# Patient Record
Sex: MALE | Race: BLACK OR AFRICAN AMERICAN
[De-identification: names, ages, dates, MRNs, and addresses within clinical notes are randomized per-mention and may not be internally consistent; named-entity substitution may affect disease eponyms.]

---

## 2020-09-17 ENCOUNTER — HOSPITAL ENCOUNTER (INPATIENT)
Dept: HOSPITAL 74 - YASAS | Age: 70
LOS: 15 days | Discharge: HOME | DRG: 895 | End: 2020-10-02
Attending: ALLERGY & IMMUNOLOGY | Admitting: ALLERGY & IMMUNOLOGY
Payer: COMMERCIAL

## 2020-09-17 VITALS — BODY MASS INDEX: 23.8 KG/M2

## 2020-09-17 DIAGNOSIS — F17.210: ICD-10-CM

## 2020-09-17 DIAGNOSIS — R32: ICD-10-CM

## 2020-09-17 DIAGNOSIS — H92.02: ICD-10-CM

## 2020-09-17 DIAGNOSIS — F10.96: ICD-10-CM

## 2020-09-17 DIAGNOSIS — Z90.79: ICD-10-CM

## 2020-09-17 DIAGNOSIS — I10: ICD-10-CM

## 2020-09-17 DIAGNOSIS — J45.909: ICD-10-CM

## 2020-09-17 DIAGNOSIS — F10.282: Primary | ICD-10-CM

## 2020-09-17 DIAGNOSIS — H61.22: ICD-10-CM

## 2020-09-17 DIAGNOSIS — K74.60: ICD-10-CM

## 2020-09-17 DIAGNOSIS — Z86.19: ICD-10-CM

## 2020-09-17 PROCEDURE — HZ42ZZZ GROUP COUNSELING FOR SUBSTANCE ABUSE TREATMENT, COGNITIVE-BEHAVIORAL: ICD-10-PCS | Performed by: ALLERGY & IMMUNOLOGY

## 2020-09-17 RX ADMIN — HYDROXYZINE PAMOATE SCH MG: 25 CAPSULE ORAL at 21:47

## 2020-09-17 RX ADMIN — Medication SCH MG: at 21:47

## 2020-09-17 NOTE — XMS
Summarization Of Episode

                          Created on:2020



Patient:IRAIS RAMIREZ

Sex:Male

:1950

External Reference #:[id number]





Demographics







                          Address                   [address] [place] [address]

 

                          Home Phone                [phone]

 

                          Preferred Language        Unknown

 

                          Marital Status            Unknown

 

                          [place] Affiliation     [place]

 

                          Race                      BL

 

                          Ethnic Group              Unknown









Author







                          Organization              [facility]









Support







                Name            Relationship    Address         Phone

 

                UE              Unavailable     Unavailable     Unavailable

 

                BERGMAN, SKY () OTHER RELATIONSHIP [address] 

[phone]



                                                         



                                                [place] [address] 









Re-disclosure Warning

The records that you are about to access may contain information from federally-
assisted alcohol or drug abuse programs. If such information is present, then 
the following federally mandated warning applies: This information has been 
disclosed to you from records protected by federal confidentiality rules (42 CFR
part 2). The federal rules prohibit you from making any further disclosure of 
this information unless further disclosure is expressly permitted by the written
consent of the person to whom it pertains or as otherwise permitted by 42 CFR 
part 2. A general authorization for the release of medical or other information 
is NOT sufficient for this purpose. The Federal rules restrict any use of the 
information to criminally investigate or prosecute any alcohol or drug abuse 
patient.The records that you are about to access may contain highly sensitive 
health information, the redisclosure of which is protected by Article 27-F of 
the [facility] Public Health law. If you continue you may haveaccess to 
information: Regarding HIV / AIDS; Provided by facilities licensed or operated 
by the [facility] Office of Mental Health; or Provided by the [facility]
Office for People With Developmental Disabilities. If such information is 
present, then the following [place] mandated warning applies: This 
information has been disclosed to you from confidential records which are 
protected by state law. State law prohibits you from making any further 
disclosure of this information without the specific written consent of the 
person to whom it pertains, or as otherwise permitted by law. Any unauthorized 
further disclosure in violation of state law may result in a fine or retirement 
sentence or both. A general authorization for the release of medical or other 
information is NOT sufficient authorization for further disclosure.



Insurance Providers







          Payer name Policy type Policy ID Covered   Covered party's Policy    P

carlton



                    / Coverage           party ID  relationship to Martin    Inf

ormation



                    type                          martin              

 

          MEDICAID            [id number]            SP                  [id number]

 

          MEDICARE            [id number]           SP                  [id number]

31



                              1                                       







Results







                    ID                  Date                Data Source

 

                    [id number]         2020 01:15:00 PM EDT [facility]











          Name      Value     Range     Interpretation Description Data      Sup

porting



                                        Code                Source(s) Document(s

)

 

          SARS                                              [facility]   



          coronavirus 2                                                   



          RNA                                                         









                                        This lab was ordered by [facility]

ct Bill [facility] and reported by [facility].











                    ID                  Date                Data Source

 

                    [id number]  2020 07:38:00 PM EDT [facility]











          Name      Value     Range     Interpretation Code Description Data Radha

rce(s) Supporting



                                                                      Document(s

)

 

          Overall                                           [facility]    



          Result:                                                     









                                        This lab was ordered by [facility] and reported by [facility].









                                        Procedure

## 2020-09-17 NOTE — PN
[facility] Progress Note


Note: 





EKG  notes NSR , septal  infarct,  age  undetermined,  ST & Marked  T-wave  

abnormality  , consider anterolateral  ischemia  .


Pt  is asymptomatic  . 


                               Vital Signs - 24 hr











  09/17/20 09/17/20 09/17/20





  19:32 19:44 20:03


 


Temperature 97.3 F L 97.3 F L 97.3 F L


 


Pulse Rate 56 L 56 L 56 L


 


Respiratory 17 17 17





Rate   


 


Blood Pressure 141/86 141/86 141/86











Per MR  , pt had cardiac evaluation @ [facility]  telemetry  





ECHO 9/10/2020  : LVH  with  normal systolic  function  , EF  65-70 %  grade  2 

diastolic  dysfunction  , mild MR  CXR  9/9/2020  Normal 


Myocardial  perfusion scan  9/11/2020 :  normal  





EKG  on admission 9/09/2020  : Sinus  rhythm , T- inversion leads  II, III, aVF 

, V4-6  , troponin 0.01,  Serial EKGs  no new changes  , Troponin trended  

0.01-0.01-0.06-0.05   . 


Nuclear stress  test  normal.  





Advised to  f/up w/ PMD and cardiology  upon d/c .

## 2020-09-17 NOTE — XMS
Summarization Of Episode

                          Created on:2020



Patient:IRAIS RAMIREZ

Sex:Male

:1950

External Reference #:[id number]





Demographics







                          Address                   [address]



                                                    [address] [place] [address]

 

                          Home Phone                [phone]

 

                          Preferred Language        Unknown

 

                          Marital Status            Unknown

 

                          Faith Affiliation     [place]

 

                          Race                      BL

 

                          Ethnic Group              Unknown









Author







                          Organization              [facility]









Support







                Name            Relationship    Address         Phone

 

                UE              Unavailable     Unavailable     Unavailable

 

                SKY BERGMAN () OTHER RELATIONSHIP [address] 

[phone]



                                                         



                                                [place] [address] 









Re-disclosure Warning

The records that you are about to access may contain information from federally-
assisted alcohol or drug abuse programs. If such information is present, then 
the following federally mandated warning applies: This information has been 
disclosed to you from records protected by federal confidentiality rules (42 CFR
part 2). The federal rules prohibit you from making any further disclosure of 
this information unless further disclosure is expressly permitted by the written
consent of the person to whom it pertains or as otherwise permitted by 42 CFR 
part 2. A general authorization for the release of medical or other information 
is NOT sufficient for this purpose. The Federal rules restrict any use of the 
information to criminally investigate or prosecute any alcohol or drug abuse 
patient.The records that you are about to access may contain highly sensitive 
health information, the redisclosure of which is protected by Article 27-F of 
the [facility] Public Health law. If you continue you may haveaccess to 
information: Regarding HIV / AIDS; Provided by facilities licensed or operated 
by the [facility] Office of Mental Health; or Provided by the [facility]
Office for People With Developmental Disabilities. If such information is 
present, then the following [place] mandated warning applies: This 
information has been disclosed to you from confidential records which are 
protected by state law. State law prohibits you from making any further 
disclosure of this information without the specific written consent of the 
person to whom it pertains, or as otherwise permitted by law. Any unauthorized 
further disclosure in violation of state law may result in a fine or long-term 
sentence or both. A general authorization for the release of medical or other 
information is NOT sufficient authorization for further disclosure.



Insurance Providers







          Payer name Policy type Policy ID Covered   Covered party's Policy    P

carlton



                    / Coverage           party ID  relationship to Martin    Inf

ormation



                    type                          martin              

 

          MEDICAID            [id number]            SP                  [id number]

 

          MEDICARE            [id number]           SP                  [id number]

31



                              1                                       







Results







                    ID                  Date                Data Source

 

                    [id number]         2020 01:15:00 PM EDT [facility]











          Name      Value     Range     Interpretation Description Data      Sup

porting



                                        Code                Source(s) Document(s

)

 

          SARS                                              [facility]   



          coronavirus 2                                                   



          RNA                                                         









                                        This lab was ordered by [facility]

ct Bill [facility] and reported by [facility].











                    ID                  Date                Data Source

 

                    [id number]  2020 07:38:00 PM EDT [facility]











          Name      Value     Range     Interpretation Code Description Data Radha

rce(s) Supporting



                                                                      Document(s

)

 

          Overall                                           [facility]    



          Result:                                                     









                                        This lab was ordered by [facility] and reported by [facility].









                                        Procedure

## 2020-09-17 NOTE — HP
CIWA Score


Nausea/Vomitin-No Nausea/No Vomiting


Muscle Tremors: None


Anxiety: 0-No Anxiety, at Ease


Agitation: 0-Normal Activity


Paroxysmal Sweats: No Perspiration


Orientation: 0-Oriented


Tacttile Disturbances: 0-None


Auditory Disturbances: 0-None


Visual Disturbances: 0-None





- Admission Criteria


[facility] Guidelines: Admission for Medically Managed Detox: 


Requires at least one of the followin. CIWA greater than 12


2. Seizures within the past 24 hours


3. Delirium tremens within the past 24 hours


4. Hallucinations within the past 24 hours


5. Acute intervention needed for co  occurring medical disorder


6. Acute intervention needed for co  occurring psychiatric disorder


7. Severe withdrawal that cannot be handled at a lower level of care (continued


    vomiting, continued diarrhea, abnormal vital signs) requiring intravenous


    medication and/or fluids


8. Pregnancy








Admitting History and Physical





- Admission


History of Present Illness: 





Patient is a 69 y.o. M PMHx HTN, liver cirrhosis, partial prostatectomy, treated

Hep C presenting to [facility] for rehab. Patient was examined in the room and is

in no acute distress. Patients most recent substance use consists of alcohol 2 

beers last week but had not drank for years prior. 


History Source: Patient


Limitations to Obtaining History: No Limitations





- Past Medical History


CNS: No: Seizure


Cardiovascular: Yes: HTN.  No: Hyperlipdemia


Pulmonary: No: Asthma


Gastrointestinal: No: GERD, GI Bleed


Hepatobiliary: Yes: Cirrhosis, Hepatitis C (treated)


Infectious Disease: No: HIV, STD's, Tuberculosis


Psych: No: Anxiety, Bipolar





- Past Surgical History


Past Surgical History: Yes: Prostatectomy





- Smoking History


Have you smoked in the past 12 months: No





- Alcohol/Substance Use


Hx Alcohol Use: No


History of Substance Use: reports: None





- Social History


Usual Living Arrangement: Yes: Alone


ADL: Independent


History of Recent Travel: No





Admission ROS [facility]


Exam Limitations: No Limitations





- Ebola screening


Have you traveled outside of the country in the last 21 days: No


Have you had contact with anyone from an Ebola affected area: No


Have you been sick,other than usual withdrawal symptoms: No


Do you have a fever: No





- Review of Systems


Constitutional: No Symptoms Reported


EENT: denies: Blurred Vision, Double Vision


Respiratory: denies: Cough, Shortness of Breath


Cardiac: denies: Chest Pain, Lightheadedness


GI: denies: Constipated, Diarrhea, Nausea, Vomiting


: reports: Incontinence.  denies: Burning, Dysuria


Musculoskeletal: denies: Muscle Pain, Muscle Weakness


Neuro: denies: Headache, Dizziness


Hematology: denies: Easy Bleeding


Psychiatric: reports: No Sypmtoms Reported, Judgement Intact, Mood/Affect 

Appropiate, Orientated x3





Patient History





- Smoking Cessation


Smoking history: Former smoker


Have you smoked in the past 12 months: No


Initiated information on smoking cessation: Yes


'Breaking Loose' booklet given: 20





Admission Physical Exam [facility]





- Vital Signs


Vital Signs: 


                               Vital Signs - 24 hr











  20





  19:32


 


Temperature 97.3 F L


 


Pulse Rate 56 L


 


Respiratory 17





Rate 


 


Blood Pressure 141/86














- Physical


General Appearance: Yes: Within Normal Limits, No Apparent Distress, Nourished, 

Appropriately Dressed


Respiratory: Yes: Within Normal Limits, Lungs Clear, Normal Breath Sounds, No 

Respiratory Distress, No Accessory Muscle Use


Cardiology: Yes: Within Normal Limits, Regular Rhythm, Regular Rate.  No: JVD, 

Murmur


Abdominal: Yes: Within Normal Limits, Normal Bowel Sounds, Non Tender, Flat, 

Soft.  No: Tenderness


Back: Yes: Within Normal Limits, Normal Inspection.  No: CVA Tenderness


Extremities: Yes: Within Normal Limits, Normal Inspection, Normal Range of 

Motion, Non-Tender


Neurological: Yes: Within Normal Limits, Fully Oriented, Alert, Normal 

Mood/Affect, Normal Response


Integumentary: Yes: Within Normal Limits, Normal Color, Dry, Warm





- Diagnostic


(1) Hypertension


Current Visit: Yes   Status: Acute   





(2) Cirrhosis of liver


Current Visit: Yes   Status: Acute   





(3) Incontinence


Current Visit: Yes   Status: Acute   





Cleared for Admission [facility]





- Detox or Rehab


[facility] Level of Care: Medically Managed


Detox Regimen/Protocol: Not Applicable


Claeared for Rehab Admission: Yes





Breathalyzer





- Breathalyzer


Breathalyzer: 0





Vital Signs





- Vital Signs


Vital signs refused: No


Temperature: 97.3 F


Pulse Rate: 56


Respiratory Rate: 17


Blood Pressure: 141/86





- Height


Height: 1.65 m





- Weight


Weight: 64.864 kg





- BMI


Body Mass Index (BMI): 23.8





Urine Drug Screen





- Test Device


Lot number: [id number]


Expiration date: 22





- Control


Is test valid?: Yes





- Results


Drug screen NEGATIVE: No


Urine drug screen results: BZO-Benzodiazepines





Inpatient Rehab Admission





- Rehab Decision to Admit


Inpatient rehab admission?: Yes





- Initial Determination


Are CD services needed?: Yes


Free of communicable disease: Yes


Not in need of hospitalization: Yes





- Rehab Admission Criteria


Previous failed treatment: Yes


Poor recovery environment: Yes


Comorbidities: Yes


Lacks judgement: Yes


Patient is meeting Inpatient Rehab admission criteria:: Yes

## 2020-09-17 NOTE — BHS.RME
Substance Use & Tx History





- Substance Use History


  ** Alcohol


Substance amount: 2 can of beer


Frequency of use: More than 3 times per week


Substance route: Oral


Date of Last Use: 20





- Last Treatment


Where was last treatment: Rehab





Physical/Psych/Mental Status





- Behavior


Eye Contact: Normal





- Cooperativeness


Cooperativeness: Cooperative





- Thinking


Thought Processes: Logical





- Physical Health Problems


Is patient presently having any pain?: No


Does patient presently have any injuries (include location): No


Does patient currently have a fever: No


Is patient pregnant: No





CIWA


Nausea/Vomitin-No Nausea/No Vomiting


Muscle Tremors: None


Anxiety: 0-No Anxiety, at Ease


Agitation: 0-Normal Activity


Paroxysmal Sweats: No Perspiration


Orientation: 0-Oriented


Tacttile Disturbances: 0-None


Auditory Disturbances: 0-None


Visual Disturbances: 0-None





Treatment Recommendation





- Level of Care


Level of Care: Opioid Treatment Program (OTP) (Rehab. SARS -COV-2 done on 

 indicates not detected)

## 2020-09-18 LAB
ALBUMIN SERPL-MCNC: 3.2 G/DL (ref 3.4–5)
ALP SERPL-CCNC: 79 U/L (ref 45–117)
ALT SERPL-CCNC: 61 U/L (ref 13–61)
ANION GAP SERPL CALC-SCNC: 3 MMOL/L (ref 8–16)
APPEARANCE UR: CLEAR
AST SERPL-CCNC: 36 U/L (ref 15–37)
BACTERIA # UR AUTO: 57 /UL (ref 0–1359)
BILIRUB SERPL-MCNC: 0.6 MG/DL (ref 0.2–1)
BILIRUB UR STRIP.AUTO-MCNC: NEGATIVE MG/DL
BUN SERPL-MCNC: 18.1 MG/DL (ref 7–18)
CALCIUM SERPL-MCNC: 8.8 MG/DL (ref 8.5–10.1)
CASTS URNS QL MICRO: 0 /UL (ref 0–3.1)
CHLORIDE SERPL-SCNC: 104 MMOL/L (ref 98–107)
CO2 SERPL-SCNC: 31 MMOL/L (ref 21–32)
COLOR UR: YELLOW
CREAT SERPL-MCNC: 1.3 MG/DL (ref 0.55–1.3)
DEPRECATED RDW RBC AUTO: 13.8 % (ref 11.9–15.9)
EPITH CASTS URNS QL MICRO: 7 /UL (ref 0–25.1)
GLUCOSE SERPL-MCNC: 145 MG/DL (ref 74–106)
HCT VFR BLD CALC: 39.1 % (ref 35.4–49)
HGB BLD-MCNC: 12.8 GM/DL (ref 11.7–16.9)
KETONES UR QL STRIP: NEGATIVE
LEUKOCYTE ESTERASE UR QL STRIP.AUTO: (no result)
MCH RBC QN AUTO: 33.6 PG (ref 25.7–33.7)
MCHC RBC AUTO-ENTMCNC: 32.7 G/DL (ref 32–35.9)
MCV RBC: 102.5 FL (ref 80–96)
NITRITE UR QL STRIP: NEGATIVE
PH UR: 5.5 [PH] (ref 5–8)
PLATELET # BLD AUTO: 122 K/MM3 (ref 134–434)
PMV BLD: 11.5 FL (ref 7.5–11.1)
POTASSIUM SERPLBLD-SCNC: 4.4 MMOL/L (ref 3.5–5.1)
PROT SERPL-MCNC: 7.5 G/DL (ref 6.4–8.2)
PROT UR QL STRIP: NEGATIVE
PROT UR QL STRIP: NEGATIVE
RBC # BLD AUTO: 2 /UL (ref 0–23.9)
RBC # BLD AUTO: 3.82 M/MM3 (ref 4–5.6)
SICKLE CELL SCREEN: NEGATIVE
SODIUM SERPL-SCNC: 138 MMOL/L (ref 136–145)
SP GR UR: 1.01 (ref 1.01–1.03)
UROBILINOGEN UR STRIP-MCNC: 0.2 MG/DL (ref 0.2–1)
WBC # BLD AUTO: 8.2 K/MM3 (ref 4–10)
WBC # UR AUTO: 65 /UL (ref 0–25.8)

## 2020-09-18 RX ADMIN — HYDROXYZINE PAMOATE SCH MG: 25 CAPSULE ORAL at 17:49

## 2020-09-18 RX ADMIN — NICOTINE SCH: 7 PATCH TRANSDERMAL at 10:02

## 2020-09-18 RX ADMIN — AMLODIPINE BESYLATE SCH MG: 5 TABLET ORAL at 10:45

## 2020-09-18 RX ADMIN — HYDROXYZINE PAMOATE SCH MG: 25 CAPSULE ORAL at 21:42

## 2020-09-18 RX ADMIN — Medication SCH MG: at 21:41

## 2020-09-18 RX ADMIN — HYDROXYZINE PAMOATE SCH: 25 CAPSULE ORAL at 06:51

## 2020-09-18 RX ADMIN — HYDROXYZINE PAMOATE SCH MG: 25 CAPSULE ORAL at 10:01

## 2020-09-18 RX ADMIN — Medication SCH TAB: at 10:01

## 2020-09-18 RX ADMIN — HYDROXYZINE PAMOATE SCH: 25 CAPSULE ORAL at 14:33

## 2020-09-18 NOTE — EKG
Test Reason : 

Blood Pressure : ***/*** mmHG

Vent. Rate : 067 BPM     Atrial Rate : 067 BPM

   P-R Int : 138 ms          QRS Dur : 086 ms

    QT Int : 414 ms       P-R-T Axes : 046 019 -80 degrees

   QTc Int : 437 ms

 

NORMAL SINUS RHYTHM

SEPTAL INFARCT , AGE UNDETERMINED



CLINICAL CORRELATION IS RECOMMENDED

NO PREVIOUS ECGS AVAILABLE

Confirmed by IRAIS WANG MD ([id number]) on 9/18/2020 12:43:28 PM

 

Referred By:             Confirmed By:IRAIS WANG MD

## 2020-09-19 RX ADMIN — NICOTINE SCH: 7 PATCH TRANSDERMAL at 10:01

## 2020-09-19 RX ADMIN — HYDROXYZINE PAMOATE SCH: 25 CAPSULE ORAL at 10:01

## 2020-09-19 RX ADMIN — HYDROXYZINE PAMOATE SCH: 25 CAPSULE ORAL at 19:19

## 2020-09-19 RX ADMIN — Medication SCH MG: at 21:09

## 2020-09-19 RX ADMIN — HYDROXYZINE PAMOATE SCH MG: 25 CAPSULE ORAL at 21:09

## 2020-09-19 RX ADMIN — HYDROXYZINE PAMOATE SCH: 25 CAPSULE ORAL at 13:44

## 2020-09-19 RX ADMIN — Medication SCH TAB: at 10:01

## 2020-09-19 RX ADMIN — HYDROXYZINE PAMOATE SCH MG: 25 CAPSULE ORAL at 06:37

## 2020-09-19 RX ADMIN — AMLODIPINE BESYLATE SCH MG: 5 TABLET ORAL at 10:01

## 2020-09-20 RX ADMIN — Medication SCH MG: at 21:40

## 2020-09-20 RX ADMIN — IBUPROFEN PRN MG: 400 TABLET, FILM COATED ORAL at 21:41

## 2020-09-20 RX ADMIN — HYDROXYZINE PAMOATE SCH: 25 CAPSULE ORAL at 10:42

## 2020-09-20 RX ADMIN — AMLODIPINE BESYLATE SCH MG: 5 TABLET ORAL at 09:41

## 2020-09-20 RX ADMIN — NICOTINE SCH: 7 PATCH TRANSDERMAL at 09:41

## 2020-09-20 RX ADMIN — Medication SCH TAB: at 09:41

## 2020-09-20 RX ADMIN — HYDROXYZINE PAMOATE SCH: 25 CAPSULE ORAL at 18:30

## 2020-09-20 RX ADMIN — ACETAMINOPHEN PRN MG: 325 TABLET ORAL at 09:41

## 2020-09-20 RX ADMIN — HYDROXYZINE PAMOATE SCH: 25 CAPSULE ORAL at 13:18

## 2020-09-20 RX ADMIN — HYDROXYZINE PAMOATE SCH MG: 25 CAPSULE ORAL at 21:40

## 2020-09-20 RX ADMIN — HYDROXYZINE PAMOATE SCH MG: 25 CAPSULE ORAL at 06:43

## 2020-09-21 RX ADMIN — NICOTINE SCH: 7 PATCH TRANSDERMAL at 10:04

## 2020-09-21 RX ADMIN — Medication SCH MG: at 21:11

## 2020-09-21 RX ADMIN — HYDROXYZINE PAMOATE SCH MG: 25 CAPSULE ORAL at 21:11

## 2020-09-21 RX ADMIN — Medication SCH TAB: at 10:03

## 2020-09-21 RX ADMIN — HYDROXYZINE PAMOATE SCH: 25 CAPSULE ORAL at 19:01

## 2020-09-21 RX ADMIN — HYDROXYZINE PAMOATE SCH: 25 CAPSULE ORAL at 06:36

## 2020-09-21 RX ADMIN — IBUPROFEN PRN MG: 400 TABLET, FILM COATED ORAL at 19:02

## 2020-09-21 RX ADMIN — AMLODIPINE BESYLATE SCH MG: 5 TABLET ORAL at 10:03

## 2020-09-21 RX ADMIN — ACETAMINOPHEN PRN MG: 325 TABLET ORAL at 06:37

## 2020-09-21 RX ADMIN — HYDROXYZINE PAMOATE SCH: 25 CAPSULE ORAL at 10:04

## 2020-09-21 RX ADMIN — OFLOXACIN SCH DROP: 3 SOLUTION AURICULAR (OTIC) at 21:11

## 2020-09-21 RX ADMIN — HYDROXYZINE PAMOATE SCH: 25 CAPSULE ORAL at 14:11

## 2020-09-21 NOTE — PN
[facility] Progress Note


Note: 





Patient c/o left ear ache x one day. Denies sore throat, cough, headache and 

fever. 





                                   Vital Signs











Temperature  96.5 F L  09/21/20 08:24


 


Pulse Rate  64   09/21/20 08:24


 


Respiratory Rate  18   09/21/20 08:24


 


Blood Pressure  123/72   09/21/20 08:24


 


O2 Sat by Pulse Oximetry (%)  96   09/21/20 06:34








                                Laboratory Tests











  09/18/20 09/18/20 09/18/20





  08:00 08:00 08:00


 


WBC  8.2  


 


RBC  3.82 L  


 


Hgb  12.8  


 


Hct  39.1  


 


MCV  102.5 H  


 


MCH  33.6  


 


MCHC  32.7  


 


RDW  13.8  


 


Plt Count  122 L  


 


MPV  11.5 H  


 


Sickle Cell Screen  Negative  


 


Sodium   138 


 


Potassium   4.4 


 


Chloride   104 


 


Carbon Dioxide   31 


 


Anion Gap   3 L 


 


BUN   18.1 H 


 


Creatinine   1.3 


 


Est GFR (CKD-EPI)AfAm   65.44 


 


Est GFR (CKD-EPI)NonAf   56.46 


 


Random Glucose   145 H 


 


Calcium   8.8 


 


Total Bilirubin   0.6 


 


AST   36 


 


ALT   61 


 


Alkaline Phosphatase   79 


 


Total Protein   7.5 


 


Albumin   3.2 L 


 


Urine Color   


 


Urine Appearance   


 


Urine pH   


 


Ur Specific Gravity   


 


Urine Protein   


 


Urine Glucose (UA)   


 


Urine Ketones   


 


Urine Blood   


 


Urine Nitrite   


 


Urine Bilirubin   


 


Urine Urobilinogen   


 


Ur Leukocyte Esterase   


 


Urine WBC (Auto)   


 


Urine RBC (Auto)   


 


Urine Casts (Auto)   


 


U Epithel Cells (Auto)   


 


Urine Bacteria (Auto)   


 


Syphilis Serology    Non-reactive














  09/18/20





  11:30


 


WBC 


 


RBC 


 


Hgb 


 


Hct 


 


MCV 


 


MCH 


 


MCHC 


 


RDW 


 


Plt Count 


 


MPV 


 


Sickle Cell Screen 


 


Sodium 


 


Potassium 


 


Chloride 


 


Carbon Dioxide 


 


Anion Gap 


 


BUN 


 


Creatinine 


 


Est GFR (CKD-EPI)AfAm 


 


Est GFR (CKD-EPI)NonAf 


 


Random Glucose 


 


Calcium 


 


Total Bilirubin 


 


AST 


 


ALT 


 


Alkaline Phosphatase 


 


Total Protein 


 


Albumin 


 


Urine Color  Yellow


 


Urine Appearance  Clear


 


Urine pH  5.5


 


Ur Specific Gravity  1.014


 


Urine Protein  Negative


 


Urine Glucose (UA)  Negative


 


Urine Ketones  Negative


 


Urine Blood  Negative


 


Urine Nitrite  Negative


 


Urine Bilirubin  Negative


 


Urine Urobilinogen  0.2


 


Ur Leukocyte Esterase  Trace


 


Urine WBC (Auto)  65


 


Urine RBC (Auto)  2


 


Urine Casts (Auto)  0


 


U Epithel Cells (Auto)  7


 


Urine Bacteria (Auto)  57


 


Syphilis Serology 








PE





alert and oriented x 3


skin warm and dry


right ear canal with cerumen, tm mildly inflamed


left ear canal with cerumen, tm inflamed and tender to touch(limited exam due to

pain)








a/p:





OTITIS MEDIA, AU








will start Ofloxacin ear drops to both ears TID x 7 days


continue motrin for pain prn as ordered


monitor clinically

## 2020-09-21 NOTE — PN
Ok to  Provide Rx.   Teaching Attending Note


Name of Resident: Gelacio Bauman





ATTENDING PHYSICIAN STATEMENT





I saw and evaluated the patient.


I reviewed the resident's note and discussed the case with the resident.


I agree with the resident's findings and plan as documented.








SUBJECTIVE:








OBJECTIVE:








ASSESSMENT AND PLAN:


Agree with resident's findings and agree with plan for detox.

## 2020-09-22 RX ADMIN — AMLODIPINE BESYLATE SCH MG: 5 TABLET ORAL at 10:36

## 2020-09-22 RX ADMIN — HYDROXYZINE PAMOATE SCH: 25 CAPSULE ORAL at 10:44

## 2020-09-22 RX ADMIN — HYDROXYZINE PAMOATE SCH: 25 CAPSULE ORAL at 06:32

## 2020-09-22 RX ADMIN — OFLOXACIN SCH DROP: 3 SOLUTION AURICULAR (OTIC) at 14:50

## 2020-09-22 RX ADMIN — Medication SCH TAB: at 10:36

## 2020-09-22 RX ADMIN — HYDROXYZINE PAMOATE SCH MG: 25 CAPSULE ORAL at 21:42

## 2020-09-22 RX ADMIN — Medication SCH MG: at 21:42

## 2020-09-22 RX ADMIN — HYDROXYZINE PAMOATE SCH: 25 CAPSULE ORAL at 14:07

## 2020-09-22 RX ADMIN — OFLOXACIN SCH DROP: 3 SOLUTION AURICULAR (OTIC) at 06:58

## 2020-09-22 RX ADMIN — HYDROXYZINE PAMOATE SCH: 25 CAPSULE ORAL at 17:16

## 2020-09-22 RX ADMIN — NICOTINE SCH: 7 PATCH TRANSDERMAL at 10:44

## 2020-09-22 RX ADMIN — OFLOXACIN SCH: 3 SOLUTION AURICULAR (OTIC) at 21:42

## 2020-09-23 RX ADMIN — OFLOXACIN SCH DROP: 3 SOLUTION AURICULAR (OTIC) at 14:29

## 2020-09-23 RX ADMIN — HYDROXYZINE PAMOATE SCH: 25 CAPSULE ORAL at 14:28

## 2020-09-23 RX ADMIN — Medication SCH MG: at 21:04

## 2020-09-23 RX ADMIN — HYDROXYZINE PAMOATE SCH: 25 CAPSULE ORAL at 06:46

## 2020-09-23 RX ADMIN — HYDROXYZINE PAMOATE SCH: 25 CAPSULE ORAL at 21:04

## 2020-09-23 RX ADMIN — Medication SCH TAB: at 10:07

## 2020-09-23 RX ADMIN — OFLOXACIN SCH: 3 SOLUTION AURICULAR (OTIC) at 21:04

## 2020-09-23 RX ADMIN — NICOTINE SCH: 7 PATCH TRANSDERMAL at 10:09

## 2020-09-23 RX ADMIN — AMLODIPINE BESYLATE SCH MG: 5 TABLET ORAL at 10:07

## 2020-09-23 RX ADMIN — OFLOXACIN SCH: 3 SOLUTION AURICULAR (OTIC) at 06:46

## 2020-09-23 RX ADMIN — HYDROXYZINE PAMOATE SCH: 25 CAPSULE ORAL at 10:07

## 2020-09-23 RX ADMIN — HYDROXYZINE PAMOATE SCH MG: 25 CAPSULE ORAL at 18:23

## 2020-09-24 RX ADMIN — HYDROXYZINE PAMOATE SCH MG: 25 CAPSULE ORAL at 06:00

## 2020-09-24 RX ADMIN — OFLOXACIN SCH: 3 SOLUTION AURICULAR (OTIC) at 14:54

## 2020-09-24 RX ADMIN — OFLOXACIN SCH DROP: 3 SOLUTION AURICULAR (OTIC) at 06:00

## 2020-09-24 RX ADMIN — Medication SCH TAB: at 10:15

## 2020-09-24 RX ADMIN — HYDROXYZINE PAMOATE SCH: 25 CAPSULE ORAL at 17:27

## 2020-09-24 RX ADMIN — HYDROXYZINE PAMOATE SCH: 25 CAPSULE ORAL at 14:54

## 2020-09-24 RX ADMIN — HYDROXYZINE PAMOATE SCH MG: 25 CAPSULE ORAL at 21:42

## 2020-09-24 RX ADMIN — OFLOXACIN SCH: 3 SOLUTION AURICULAR (OTIC) at 21:43

## 2020-09-24 RX ADMIN — Medication SCH MG: at 21:42

## 2020-09-24 RX ADMIN — AMLODIPINE BESYLATE SCH MG: 5 TABLET ORAL at 10:15

## 2020-09-24 RX ADMIN — TRAZODONE HYDROCHLORIDE SCH MG: 100 TABLET ORAL at 21:43

## 2020-09-24 RX ADMIN — NICOTINE SCH: 7 PATCH TRANSDERMAL at 10:15

## 2020-09-24 RX ADMIN — HYDROXYZINE PAMOATE SCH: 25 CAPSULE ORAL at 10:15

## 2020-09-24 NOTE — CONSULT
[facility]HS Psychiatric Consult





- Data


Date of interview: 09/24/20


Admission source: [facility]


Identifying data: Mr López is a 69 years old single Black male, retired 

receiving social security, living in an SRO in the [place] admitted on 9/17/20 for

inpatient rehabilitation treatment for alcohol


Substance Abuse History: Reports history of alcohol use. Refer to addiction 

counselor's summary for further information


Medical History: Significant for bronchial asthma, hypertension, cirrhosis of 

the liver, history of treatment for hepatitis cC and TURP. Smokes 10 cigarettes 

daily


Psychiatric History: This is patient's first admission to this facility. He 

denies histoty of previous psychiatric treatment. However, reports being 

prescribed Trazadone 100 mg/hs for insomnia by [facility]. Denies previous psychiatric hospitalization or suicidal attempt. At present,

reports sleeping poorly


Physical/Sexual Abuse/Trauma History: Denies history of abuse as a child or DV 

relationship as an adult





Mental Status Exam





- Mental Status Exam


Alert and Oriented to: Time (October 23, 1969), Place ([place]), Person


Cognitive Function: Fair


Patient Appearance: Well Groomed


Mood: Hopeful, Euthymic


Affect: Appropriate


Patient Behavior: Cooperative


Speech Pattern: Clear


Voice Loudness: Normal


Thought Process: Intact, Goal Oriented


Thought Disorder: Not Present


Hallucinations: Denies


Suicidal Ideation: Denies


Homicidal Ideation: Denies


Insight/Judgement: Fair


Sleep: Poorly


Appetite: Good


Muscle strength/Tone: Normal


Gait/Station: Normal





Psychiatric Findings





- Problem List (Axis 1, 2,3)


(1) Alcohol-induced sleep disorder


Current Visit: Yes   Status: Acute   





(2) Alcohol amnestic disorder


Current Visit: Yes   Status: Chronic   





(3) Alcohol dependence


Current Visit: Yes   Status: Acute   





(4) Cirrhosis of liver


Current Visit: Yes   Status: Chronic   





(5) Hypertension


Current Visit: Yes   Status: Chronic   





(6) Hepatitis C


Current Visit: Yes   Status: Chronic   





- Initial Treatment Plan


Initial Treatment Plan: 1) Continue Trazadone 100 mg po HS for insomnia.  2) 

Continue inpatient detoxification

## 2020-09-25 RX ADMIN — OFLOXACIN SCH DROP: 3 SOLUTION AURICULAR (OTIC) at 22:02

## 2020-09-25 RX ADMIN — OFLOXACIN SCH DROP: 3 SOLUTION AURICULAR (OTIC) at 06:09

## 2020-09-25 RX ADMIN — HYDROXYZINE PAMOATE SCH MG: 25 CAPSULE ORAL at 22:02

## 2020-09-25 RX ADMIN — NICOTINE SCH: 7 PATCH TRANSDERMAL at 09:56

## 2020-09-25 RX ADMIN — HYDROXYZINE PAMOATE SCH: 25 CAPSULE ORAL at 14:09

## 2020-09-25 RX ADMIN — HYDROXYZINE PAMOATE SCH: 25 CAPSULE ORAL at 09:57

## 2020-09-25 RX ADMIN — Medication SCH: at 22:03

## 2020-09-25 RX ADMIN — OFLOXACIN SCH: 3 SOLUTION AURICULAR (OTIC) at 14:25

## 2020-09-25 RX ADMIN — Medication SCH MG: at 22:02

## 2020-09-25 RX ADMIN — TRAZODONE HYDROCHLORIDE SCH MG: 100 TABLET ORAL at 22:02

## 2020-09-25 RX ADMIN — Medication SCH TAB: at 09:55

## 2020-09-25 RX ADMIN — AMLODIPINE BESYLATE SCH MG: 5 TABLET ORAL at 09:56

## 2020-09-25 RX ADMIN — HYDROXYZINE PAMOATE SCH MG: 25 CAPSULE ORAL at 06:09

## 2020-09-25 RX ADMIN — HYDROXYZINE PAMOATE SCH: 25 CAPSULE ORAL at 18:10

## 2020-09-26 RX ADMIN — HYDROXYZINE PAMOATE SCH: 25 CAPSULE ORAL at 09:58

## 2020-09-26 RX ADMIN — HYDROXYZINE PAMOATE SCH: 25 CAPSULE ORAL at 15:04

## 2020-09-26 RX ADMIN — NICOTINE SCH: 7 PATCH TRANSDERMAL at 09:59

## 2020-09-26 RX ADMIN — Medication SCH TAB: at 09:58

## 2020-09-26 RX ADMIN — HYDROXYZINE PAMOATE SCH: 25 CAPSULE ORAL at 18:19

## 2020-09-26 RX ADMIN — OFLOXACIN SCH: 3 SOLUTION AURICULAR (OTIC) at 15:05

## 2020-09-26 RX ADMIN — Medication SCH MG: at 21:11

## 2020-09-26 RX ADMIN — AMLODIPINE BESYLATE SCH MG: 5 TABLET ORAL at 09:58

## 2020-09-26 RX ADMIN — OFLOXACIN SCH: 3 SOLUTION AURICULAR (OTIC) at 21:10

## 2020-09-26 RX ADMIN — HYDROXYZINE PAMOATE SCH: 25 CAPSULE ORAL at 06:10

## 2020-09-26 RX ADMIN — OFLOXACIN SCH DROP: 3 SOLUTION AURICULAR (OTIC) at 06:00

## 2020-09-26 RX ADMIN — HYDROXYZINE PAMOATE SCH: 25 CAPSULE ORAL at 21:11

## 2020-09-26 RX ADMIN — TRAZODONE HYDROCHLORIDE SCH MG: 100 TABLET ORAL at 21:10

## 2020-09-27 RX ADMIN — HYDROXYZINE PAMOATE SCH: 25 CAPSULE ORAL at 18:33

## 2020-09-27 RX ADMIN — HYDROXYZINE PAMOATE SCH: 25 CAPSULE ORAL at 06:26

## 2020-09-27 RX ADMIN — Medication SCH MG: at 21:24

## 2020-09-27 RX ADMIN — TRAZODONE HYDROCHLORIDE SCH MG: 100 TABLET ORAL at 21:24

## 2020-09-27 RX ADMIN — HYDROXYZINE PAMOATE SCH: 25 CAPSULE ORAL at 10:05

## 2020-09-27 RX ADMIN — HYDROXYZINE PAMOATE SCH: 25 CAPSULE ORAL at 14:28

## 2020-09-27 RX ADMIN — OFLOXACIN SCH: 3 SOLUTION AURICULAR (OTIC) at 14:28

## 2020-09-27 RX ADMIN — NICOTINE SCH: 7 PATCH TRANSDERMAL at 10:05

## 2020-09-27 RX ADMIN — OFLOXACIN SCH DROP: 3 SOLUTION AURICULAR (OTIC) at 06:27

## 2020-09-27 RX ADMIN — Medication SCH TAB: at 10:05

## 2020-09-27 RX ADMIN — AMLODIPINE BESYLATE SCH MG: 5 TABLET ORAL at 10:05

## 2020-09-27 RX ADMIN — OFLOXACIN SCH: 3 SOLUTION AURICULAR (OTIC) at 21:24

## 2020-09-27 RX ADMIN — HYDROXYZINE PAMOATE SCH: 25 CAPSULE ORAL at 21:24

## 2020-09-28 RX ADMIN — Medication SCH MG: at 21:36

## 2020-09-28 RX ADMIN — HYDROXYZINE PAMOATE SCH: 25 CAPSULE ORAL at 09:43

## 2020-09-28 RX ADMIN — OFLOXACIN SCH DROP: 3 SOLUTION AURICULAR (OTIC) at 06:27

## 2020-09-28 RX ADMIN — Medication SCH TAB: at 09:42

## 2020-09-28 RX ADMIN — HYDROXYZINE PAMOATE SCH: 25 CAPSULE ORAL at 21:36

## 2020-09-28 RX ADMIN — NICOTINE SCH: 7 PATCH TRANSDERMAL at 09:42

## 2020-09-28 RX ADMIN — AMLODIPINE BESYLATE SCH MG: 5 TABLET ORAL at 09:42

## 2020-09-28 RX ADMIN — OFLOXACIN SCH: 3 SOLUTION AURICULAR (OTIC) at 14:24

## 2020-09-28 RX ADMIN — TRAZODONE HYDROCHLORIDE SCH MG: 100 TABLET ORAL at 21:36

## 2020-09-28 RX ADMIN — HYDROXYZINE PAMOATE SCH: 25 CAPSULE ORAL at 06:26

## 2020-09-28 RX ADMIN — HYDROXYZINE PAMOATE SCH: 25 CAPSULE ORAL at 17:25

## 2020-09-28 RX ADMIN — HYDROXYZINE PAMOATE SCH: 25 CAPSULE ORAL at 14:24

## 2020-09-29 RX ADMIN — NICOTINE SCH: 7 PATCH TRANSDERMAL at 09:33

## 2020-09-29 RX ADMIN — HYDROXYZINE PAMOATE SCH: 25 CAPSULE ORAL at 09:33

## 2020-09-29 RX ADMIN — HYDROXYZINE PAMOATE SCH: 25 CAPSULE ORAL at 06:16

## 2020-09-29 RX ADMIN — TRAZODONE HYDROCHLORIDE SCH MG: 100 TABLET ORAL at 21:04

## 2020-09-29 RX ADMIN — HYDROXYZINE PAMOATE SCH: 25 CAPSULE ORAL at 18:34

## 2020-09-29 RX ADMIN — Medication SCH MG: at 21:04

## 2020-09-29 RX ADMIN — AMLODIPINE BESYLATE SCH MG: 5 TABLET ORAL at 09:33

## 2020-09-29 RX ADMIN — Medication SCH TAB: at 09:33

## 2020-09-29 RX ADMIN — HYDROXYZINE PAMOATE SCH: 25 CAPSULE ORAL at 21:04

## 2020-09-29 RX ADMIN — HYDROXYZINE PAMOATE SCH: 25 CAPSULE ORAL at 14:08

## 2020-09-30 RX ADMIN — Medication SCH MG: at 22:03

## 2020-09-30 RX ADMIN — Medication SCH TAB: at 09:41

## 2020-09-30 RX ADMIN — NICOTINE SCH: 7 PATCH TRANSDERMAL at 09:42

## 2020-09-30 RX ADMIN — HYDROXYZINE PAMOATE SCH: 25 CAPSULE ORAL at 06:10

## 2020-09-30 RX ADMIN — HYDROXYZINE PAMOATE SCH: 25 CAPSULE ORAL at 19:13

## 2020-09-30 RX ADMIN — HYDROXYZINE PAMOATE SCH: 25 CAPSULE ORAL at 13:16

## 2020-09-30 RX ADMIN — HYDROXYZINE PAMOATE SCH: 25 CAPSULE ORAL at 22:03

## 2020-09-30 RX ADMIN — HYDROXYZINE PAMOATE SCH: 25 CAPSULE ORAL at 09:42

## 2020-09-30 RX ADMIN — AMLODIPINE BESYLATE SCH MG: 5 TABLET ORAL at 09:41

## 2020-09-30 RX ADMIN — TRAZODONE HYDROCHLORIDE SCH MG: 100 TABLET ORAL at 22:03

## 2020-10-01 RX ADMIN — HYDROXYZINE PAMOATE SCH: 25 CAPSULE ORAL at 07:05

## 2020-10-01 RX ADMIN — Medication SCH MG: at 21:14

## 2020-10-01 RX ADMIN — HYDROXYZINE PAMOATE SCH: 25 CAPSULE ORAL at 17:32

## 2020-10-01 RX ADMIN — Medication SCH MG: at 21:15

## 2020-10-01 RX ADMIN — AMLODIPINE BESYLATE SCH MG: 5 TABLET ORAL at 09:37

## 2020-10-01 RX ADMIN — HYDROXYZINE PAMOATE SCH: 25 CAPSULE ORAL at 14:18

## 2020-10-01 RX ADMIN — Medication SCH TAB: at 09:37

## 2020-10-01 RX ADMIN — NICOTINE SCH: 7 PATCH TRANSDERMAL at 09:37

## 2020-10-01 RX ADMIN — HYDROXYZINE PAMOATE SCH: 25 CAPSULE ORAL at 09:37

## 2020-10-01 RX ADMIN — IBUPROFEN PRN MG: 400 TABLET, FILM COATED ORAL at 21:15

## 2020-10-01 RX ADMIN — TRAZODONE HYDROCHLORIDE SCH MG: 100 TABLET ORAL at 21:14

## 2020-10-01 RX ADMIN — HYDROXYZINE PAMOATE SCH: 25 CAPSULE ORAL at 21:15

## 2020-10-01 NOTE — PN
[facility] Progress Note


Note: 





Patient is scheduled for discharge tomorrow. Script for 30 days supply of 

Trazadone 100 mg/hs will be electronically transmitted to [facility], [address] [place] [id number]

## 2020-10-02 VITALS — TEMPERATURE: 97.9 F

## 2020-10-02 VITALS — SYSTOLIC BLOOD PRESSURE: 158 MMHG | DIASTOLIC BLOOD PRESSURE: 88 MMHG | HEART RATE: 81 BPM

## 2020-10-02 RX ADMIN — Medication SCH TAB: at 09:19

## 2020-10-02 RX ADMIN — HYDROXYZINE PAMOATE SCH: 25 CAPSULE ORAL at 06:49

## 2020-10-02 RX ADMIN — AMLODIPINE BESYLATE SCH MG: 5 TABLET ORAL at 09:19

## 2020-10-02 RX ADMIN — NICOTINE SCH: 7 PATCH TRANSDERMAL at 09:21

## 2020-10-02 NOTE — DS
[facility] Re[facility] Discharge Summary





- [facility] Re[facility] Discharge Summary


Admission Date: 09/17/20


Discharge Date: 10/02/20





- History


Present History: Alcohol dependence





- Discharge Physical Exam


Vital Signs: 


                                   Vital Signs











Temperature  97.9 F   10/02/20 05:58


 


Pulse Rate  81   10/02/20 09:02


 


Respiratory Rate  18   10/02/20 09:02


 


Blood Pressure  158/88   10/02/20 09:02


 


O2 Sat by Pulse Oximetry (%)  96   10/02/20 05:58








                                Laboratory Tests











  09/18/20 09/18/20 09/18/20





  08:00 08:00 08:00


 


WBC  8.2  


 


RBC  3.82 L  


 


Hgb  12.8  


 


Hct  39.1  


 


MCV  102.5 H  


 


MCH  33.6  


 


MCHC  32.7  


 


RDW  13.8  


 


Plt Count  122 L  


 


MPV  11.5 H  


 


Sickle Cell Screen  Negative  


 


Sodium   138 


 


Potassium   4.4 


 


Chloride   104 


 


Carbon Dioxide   31 


 


Anion Gap   3 L 


 


BUN   18.1 H 


 


Creatinine   1.3 


 


Est GFR (CKD-EPI)AfAm   65.44 


 


Est GFR (CKD-EPI)NonAf   56.46 


 


POC Glucometer   


 


Random Glucose   145 H 


 


Calcium   8.8 


 


Total Bilirubin   0.6 


 


AST   36 


 


ALT   61 


 


Alkaline Phosphatase   79 


 


Total Protein   7.5 


 


Albumin   3.2 L 


 


Urine Color   


 


Urine Appearance   


 


Urine pH   


 


Ur Specific Gravity   


 


Urine Protein   


 


Urine Glucose (UA)   


 


Urine Ketones   


 


Urine Blood   


 


Urine Nitrite   


 


Urine Bilirubin   


 


Urine Urobilinogen   


 


Ur Leukocyte Esterase   


 


Urine WBC (Auto)   


 


Urine RBC (Auto)   


 


Urine Casts (Auto)   


 


U Epithel Cells (Auto)   


 


Urine Bacteria (Auto)   


 


Syphilis Serology    Non-reactive














  09/18/20 09/22/20 09/23/20





  11:30 06:31 06:45


 


WBC   


 


RBC   


 


Hgb   


 


Hct   


 


MCV   


 


MCH   


 


MCHC   


 


RDW   


 


Plt Count   


 


MPV   


 


Sickle Cell Screen   


 


Sodium   


 


Potassium   


 


Chloride   


 


Carbon Dioxide   


 


Anion Gap   


 


BUN   


 


Creatinine   


 


Est GFR (CKD-EPI)AfAm   


 


Est GFR (CKD-EPI)NonAf   


 


POC Glucometer   94  159


 


Random Glucose   


 


Calcium   


 


Total Bilirubin   


 


AST   


 


ALT   


 


Alkaline Phosphatase   


 


Total Protein   


 


Albumin   


 


Urine Color  Yellow  


 


Urine Appearance  Clear  


 


Urine pH  5.5  


 


Ur Specific Gravity  1.014  


 


Urine Protein  Negative  


 


Urine Glucose (UA)  Negative  


 


Urine Ketones  Negative  


 


Urine Blood  Negative  


 


Urine Nitrite  Negative  


 


Urine Bilirubin  Negative  


 


Urine Urobilinogen  0.2  


 


Ur Leukocyte Esterase  Trace  


 


Urine WBC (Auto)  65  


 


Urine RBC (Auto)  2  


 


Urine Casts (Auto)  0  


 


U Epithel Cells (Auto)  7  


 


Urine Bacteria (Auto)  57  


 


Syphilis Serology   








ROS: PATIENT DENIES SHAKES, SWEATS, CHEST PAIN, SOB, FEVER, COUGH AND ALCOHOL 

CRAVINGS





PE:





ALERT AND ORIENTED X 3


SKIN WARM AND DRY


 EOMS INTACT B/L


CAR S1S2, RRR


RESP CTA BL 


EXT FULL ROM, NO TREMORS


AMB AD [facility]ENNY


DENIES SI/HI





A/P:





ETOH DEPENDENCE





PATIENT IS MEDICALLY STABLE FOR D/C


[facility] FOR [facility]























- Treatment


Discharge Condition: Discharge condition good, Rehabilitated safely, Responded 

well, Outpatient referral accepted


Hospital Course: 





PATIENT IS DISCHARGED FROM REHAB TODAY FOR ALCOHOL DEPENDENCE. HE IS MEDICALLY 

STABLE AND DENIES SI/HI. DURING COURSE OF TREATMENT, PATIENT ATTENDED GROUP 

MEETINGS, ATTENDED 1:1 SESSIONS WITH COUNSELING STAFF AND WAS EVALUATED AND 

TREATED BY PSYCH TEAM.  [facility] AR[facility] FOR [facility]. PATIENT MEDICALLY ADVISED

TO FOLLOW UP WITH PCP AS RECOMMENDED AND TO CONTINUE WITH OTP SERVICES TO 

PREVENT RELAPSE. 


Ambulatory Orders





Amlodipine Besylate 5 mg PO DAILY #14 tablet 10/01/20 


traZODone HCL [Trazodone HCl] 100 mg PO HS #30 tablet 10/01/20 





  





- Medication


Discharge Medications: 


Ambulatory Orders





Amlodipine Besylate 5 mg PO DAILY #14 tablet 10/01/20 


traZODone HCL [Trazodone HCl] 100 mg PO HS #30 tablet 10/01/20 











- Medication-Assisted Treatment (MAT)


Medication-Assisted Treatment (MAT): No





- Discharge Instructions


Diet, activity, other medical instructions: 





Diet: EDU/ REG AS TOLERATED





Activity: AD JENNY





Other medical instructions: F/U WITH PCP AS RECOMMENDED








- Follow-up Referral


Minutes to complete discharge: 35





- AMA


Did Patient Leave Against Medical Advice: No

## 2021-02-04 ENCOUNTER — HOSPITAL ENCOUNTER (INPATIENT)
Dept: HOSPITAL 74 - YASAS | Age: 71
LOS: 4 days | Discharge: HOME | DRG: 897 | End: 2021-02-08
Attending: ALLERGY & IMMUNOLOGY | Admitting: ALLERGY & IMMUNOLOGY
Payer: COMMERCIAL

## 2021-02-04 VITALS — BODY MASS INDEX: 22.6 KG/M2

## 2021-02-04 DIAGNOSIS — F10.282: ICD-10-CM

## 2021-02-04 DIAGNOSIS — F17.210: ICD-10-CM

## 2021-02-04 DIAGNOSIS — F16.10: ICD-10-CM

## 2021-02-04 DIAGNOSIS — B18.2: ICD-10-CM

## 2021-02-04 DIAGNOSIS — R73.9: ICD-10-CM

## 2021-02-04 DIAGNOSIS — R94.5: ICD-10-CM

## 2021-02-04 DIAGNOSIS — F10.26: ICD-10-CM

## 2021-02-04 DIAGNOSIS — N17.9: ICD-10-CM

## 2021-02-04 DIAGNOSIS — N40.1: ICD-10-CM

## 2021-02-04 DIAGNOSIS — Z56.0: ICD-10-CM

## 2021-02-04 DIAGNOSIS — I12.9: ICD-10-CM

## 2021-02-04 DIAGNOSIS — N39.498: ICD-10-CM

## 2021-02-04 DIAGNOSIS — F14.10: ICD-10-CM

## 2021-02-04 DIAGNOSIS — K70.30: ICD-10-CM

## 2021-02-04 DIAGNOSIS — F10.230: Primary | ICD-10-CM

## 2021-02-04 LAB
ALBUMIN SERPL-MCNC: 3.4 G/DL (ref 3.4–5)
ALP SERPL-CCNC: 86 U/L (ref 45–117)
ALT SERPL-CCNC: 43 U/L (ref 13–61)
ANION GAP SERPL CALC-SCNC: 5 MMOL/L (ref 8–16)
AST SERPL-CCNC: 51 U/L (ref 15–37)
BILIRUB SERPL-MCNC: 1.5 MG/DL (ref 0.2–1)
BUN SERPL-MCNC: 16.8 MG/DL (ref 7–18)
CALCIUM SERPL-MCNC: 8.9 MG/DL (ref 8.5–10.1)
CHLORIDE SERPL-SCNC: 108 MMOL/L (ref 98–107)
CO2 SERPL-SCNC: 31 MMOL/L (ref 21–32)
CREAT SERPL-MCNC: 1.4 MG/DL (ref 0.55–1.3)
DEPRECATED RDW RBC AUTO: 14.1 % (ref 11.9–15.9)
GLUCOSE SERPL-MCNC: 113 MG/DL (ref 74–106)
HCT VFR BLD CALC: 39.6 % (ref 35.4–49)
HGB BLD-MCNC: 13.3 GM/DL (ref 11.7–16.9)
MCH RBC QN AUTO: 34.1 PG (ref 25.7–33.7)
MCHC RBC AUTO-ENTMCNC: 33.5 G/DL (ref 32–35.9)
MCV RBC: 101.8 FL (ref 80–96)
PLATELET # BLD AUTO: 136 K/MM3 (ref 134–434)
PMV BLD: 11.3 FL (ref 7.5–11.1)
POTASSIUM SERPLBLD-SCNC: 4.4 MMOL/L (ref 3.5–5.1)
PROT SERPL-MCNC: 7.9 G/DL (ref 6.4–8.2)
RBC # BLD AUTO: 3.89 M/MM3 (ref 4–5.6)
SODIUM SERPL-SCNC: 144 MMOL/L (ref 136–145)
WBC # BLD AUTO: 7.9 K/MM3 (ref 4–10)

## 2021-02-04 PROCEDURE — U0003 INFECTIOUS AGENT DETECTION BY NUCLEIC ACID (DNA OR RNA); SEVERE ACUTE RESPIRATORY SYNDROME CORONAVIRUS 2 (SARS-COV-2) (CORONAVIRUS DISEASE [COVID-19]), AMPLIFIED PROBE TECHNIQUE, MAKING USE OF HIGH THROUGHPUT TECHNOLOGIES AS DESCRIBED BY CMS-2020-01-R: HCPCS

## 2021-02-04 PROCEDURE — C9803 HOPD COVID-19 SPEC COLLECT: HCPCS

## 2021-02-04 PROCEDURE — G0008 ADMIN INFLUENZA VIRUS VAC: HCPCS

## 2021-02-04 PROCEDURE — HZ2ZZZZ DETOXIFICATION SERVICES FOR SUBSTANCE ABUSE TREATMENT: ICD-10-PCS | Performed by: ALLERGY & IMMUNOLOGY

## 2021-02-04 RX ADMIN — TRAZODONE HYDROCHLORIDE SCH MG: 100 TABLET ORAL at 22:04

## 2021-02-04 RX ADMIN — Medication SCH MG: at 22:04

## 2021-02-04 RX ADMIN — Medication SCH TAB: at 15:37

## 2021-02-04 RX ADMIN — HYDROXYZINE PAMOATE SCH MG: 25 CAPSULE ORAL at 22:04

## 2021-02-04 RX ADMIN — HYDROXYZINE PAMOATE SCH MG: 25 CAPSULE ORAL at 17:46

## 2021-02-04 RX ADMIN — AMLODIPINE BESYLATE SCH MG: 5 TABLET ORAL at 15:37

## 2021-02-04 SDOH — ECONOMIC STABILITY - INCOME SECURITY: UNEMPLOYMENT, UNSPECIFIED: Z56.0

## 2021-02-05 RX ADMIN — AMLODIPINE BESYLATE SCH MG: 5 TABLET ORAL at 10:25

## 2021-02-05 RX ADMIN — HYDROXYZINE PAMOATE SCH MG: 25 CAPSULE ORAL at 06:19

## 2021-02-05 RX ADMIN — TRAZODONE HYDROCHLORIDE SCH MG: 100 TABLET ORAL at 22:54

## 2021-02-05 RX ADMIN — Medication SCH MG: at 22:53

## 2021-02-05 RX ADMIN — Medication SCH TAB: at 10:25

## 2021-02-06 RX ADMIN — AMLODIPINE BESYLATE SCH MG: 5 TABLET ORAL at 10:12

## 2021-02-06 RX ADMIN — Medication SCH TAB: at 10:11

## 2021-02-06 RX ADMIN — Medication SCH MG: at 22:07

## 2021-02-06 RX ADMIN — TRAZODONE HYDROCHLORIDE SCH MG: 100 TABLET ORAL at 22:07

## 2021-02-07 RX ADMIN — AMLODIPINE BESYLATE SCH MG: 5 TABLET ORAL at 10:20

## 2021-02-07 RX ADMIN — Medication SCH MG: at 22:24

## 2021-02-07 RX ADMIN — Medication SCH TAB: at 10:20

## 2021-02-07 RX ADMIN — TRAZODONE HYDROCHLORIDE SCH MG: 100 TABLET ORAL at 22:24

## 2021-02-08 VITALS — DIASTOLIC BLOOD PRESSURE: 71 MMHG | HEART RATE: 72 BPM | TEMPERATURE: 97.7 F | SYSTOLIC BLOOD PRESSURE: 113 MMHG

## 2021-05-13 ENCOUNTER — HOSPITAL ENCOUNTER (INPATIENT)
Dept: HOSPITAL 74 - YASAS | Age: 71
LOS: 4 days | Discharge: HOME | DRG: 897 | End: 2021-05-17
Attending: ALLERGY & IMMUNOLOGY | Admitting: ALLERGY & IMMUNOLOGY
Payer: COMMERCIAL

## 2021-05-13 VITALS — BODY MASS INDEX: 21.9 KG/M2

## 2021-05-13 DIAGNOSIS — F14.20: ICD-10-CM

## 2021-05-13 DIAGNOSIS — F32.9: ICD-10-CM

## 2021-05-13 DIAGNOSIS — N40.1: ICD-10-CM

## 2021-05-13 DIAGNOSIS — F10.282: ICD-10-CM

## 2021-05-13 DIAGNOSIS — B18.2: ICD-10-CM

## 2021-05-13 DIAGNOSIS — K70.30: ICD-10-CM

## 2021-05-13 DIAGNOSIS — Z98.890: ICD-10-CM

## 2021-05-13 DIAGNOSIS — F10.230: Primary | ICD-10-CM

## 2021-05-13 DIAGNOSIS — F43.10: ICD-10-CM

## 2021-05-13 DIAGNOSIS — R32: ICD-10-CM

## 2021-05-13 DIAGNOSIS — F17.210: ICD-10-CM

## 2021-05-13 DIAGNOSIS — F19.24: ICD-10-CM

## 2021-05-13 DIAGNOSIS — I10: ICD-10-CM

## 2021-05-13 LAB
ALBUMIN SERPL-MCNC: 3.4 G/DL (ref 3.4–5)
ALP SERPL-CCNC: 86 U/L (ref 45–117)
ALT SERPL-CCNC: 30 U/L (ref 13–61)
ANION GAP SERPL CALC-SCNC: 3 MMOL/L (ref 8–16)
AST SERPL-CCNC: 29 U/L (ref 15–37)
BILIRUB SERPL-MCNC: 0.3 MG/DL (ref 0.2–1)
BUN SERPL-MCNC: 15.6 MG/DL (ref 7–18)
CALCIUM SERPL-MCNC: 8.7 MG/DL (ref 8.5–10.1)
CHLORIDE SERPL-SCNC: 109 MMOL/L (ref 98–107)
CO2 SERPL-SCNC: 30 MMOL/L (ref 21–32)
CREAT SERPL-MCNC: 1.3 MG/DL (ref 0.55–1.3)
DEPRECATED RDW RBC AUTO: 13.6 % (ref 11.9–15.9)
GLUCOSE SERPL-MCNC: 79 MG/DL (ref 74–106)
HCT VFR BLD CALC: 39.3 % (ref 35.4–49)
HGB BLD-MCNC: 13.3 GM/DL (ref 11.7–16.9)
MCH RBC QN AUTO: 33.7 PG (ref 25.7–33.7)
MCHC RBC AUTO-ENTMCNC: 33.7 G/DL (ref 32–35.9)
MCV RBC: 99.9 FL (ref 80–96)
PLATELET # BLD AUTO: 141 K/MM3 (ref 134–434)
PMV BLD: 10.9 FL (ref 7.5–11.1)
PROT SERPL-MCNC: 7.8 G/DL (ref 6.4–8.2)
RBC # BLD AUTO: 3.93 M/MM3 (ref 4–5.6)
SODIUM SERPL-SCNC: 142 MMOL/L (ref 136–145)
WBC # BLD AUTO: 5.4 K/MM3 (ref 4–10)

## 2021-05-13 PROCEDURE — HZ2ZZZZ DETOXIFICATION SERVICES FOR SUBSTANCE ABUSE TREATMENT: ICD-10-PCS | Performed by: ALLERGY & IMMUNOLOGY

## 2021-05-13 PROCEDURE — U0005 INFEC AGEN DETEC AMPLI PROBE: HCPCS

## 2021-05-13 PROCEDURE — U0003 INFECTIOUS AGENT DETECTION BY NUCLEIC ACID (DNA OR RNA); SEVERE ACUTE RESPIRATORY SYNDROME CORONAVIRUS 2 (SARS-COV-2) (CORONAVIRUS DISEASE [COVID-19]), AMPLIFIED PROBE TECHNIQUE, MAKING USE OF HIGH THROUGHPUT TECHNOLOGIES AS DESCRIBED BY CMS-2020-01-R: HCPCS

## 2021-05-13 PROCEDURE — C9803 HOPD COVID-19 SPEC COLLECT: HCPCS

## 2021-05-13 RX ADMIN — NICOTINE SCH: 14 PATCH, EXTENDED RELEASE TRANSDERMAL at 15:26

## 2021-05-13 RX ADMIN — HYDROXYZINE PAMOATE SCH: 25 CAPSULE ORAL at 15:26

## 2021-05-13 RX ADMIN — Medication SCH TAB: at 15:26

## 2021-05-13 RX ADMIN — Medication SCH MG: at 22:40

## 2021-05-13 RX ADMIN — AMLODIPINE BESYLATE SCH MG: 5 TABLET ORAL at 15:25

## 2021-05-13 RX ADMIN — HYDROXYZINE PAMOATE SCH MG: 25 CAPSULE ORAL at 22:40

## 2021-05-13 RX ADMIN — HYDROXYZINE PAMOATE SCH MG: 25 CAPSULE ORAL at 17:42

## 2021-05-14 RX ADMIN — HYDROXYZINE PAMOATE SCH MG: 25 CAPSULE ORAL at 10:57

## 2021-05-14 RX ADMIN — HYDROXYZINE PAMOATE SCH: 25 CAPSULE ORAL at 13:01

## 2021-05-14 RX ADMIN — NICOTINE SCH: 14 PATCH, EXTENDED RELEASE TRANSDERMAL at 10:57

## 2021-05-14 RX ADMIN — Medication SCH TAB: at 10:57

## 2021-05-14 RX ADMIN — Medication SCH MG: at 22:32

## 2021-05-14 RX ADMIN — HYDROXYZINE PAMOATE SCH MG: 25 CAPSULE ORAL at 22:32

## 2021-05-14 RX ADMIN — AMLODIPINE BESYLATE SCH MG: 5 TABLET ORAL at 10:57

## 2021-05-14 RX ADMIN — HYDROXYZINE PAMOATE SCH MG: 25 CAPSULE ORAL at 18:14

## 2021-05-14 RX ADMIN — TRAZODONE HYDROCHLORIDE SCH MG: 100 TABLET ORAL at 22:32

## 2021-05-14 RX ADMIN — HYDROXYZINE PAMOATE SCH MG: 25 CAPSULE ORAL at 05:39

## 2021-05-15 RX ADMIN — HYDROXYZINE PAMOATE SCH MG: 25 CAPSULE ORAL at 10:43

## 2021-05-15 RX ADMIN — HYDROXYZINE PAMOATE SCH MG: 25 CAPSULE ORAL at 17:51

## 2021-05-15 RX ADMIN — AMLODIPINE BESYLATE SCH MG: 5 TABLET ORAL at 10:43

## 2021-05-15 RX ADMIN — Medication SCH MG: at 22:30

## 2021-05-15 RX ADMIN — TRAZODONE HYDROCHLORIDE SCH MG: 100 TABLET ORAL at 22:30

## 2021-05-15 RX ADMIN — HYDROXYZINE PAMOATE SCH MG: 25 CAPSULE ORAL at 05:42

## 2021-05-15 RX ADMIN — NICOTINE SCH: 14 PATCH, EXTENDED RELEASE TRANSDERMAL at 10:43

## 2021-05-15 RX ADMIN — HYDROXYZINE PAMOATE SCH MG: 25 CAPSULE ORAL at 22:30

## 2021-05-15 RX ADMIN — Medication SCH TAB: at 10:43

## 2021-05-15 RX ADMIN — HYDROXYZINE PAMOATE SCH: 25 CAPSULE ORAL at 14:03

## 2021-05-16 RX ADMIN — NICOTINE SCH: 14 PATCH, EXTENDED RELEASE TRANSDERMAL at 10:33

## 2021-05-16 RX ADMIN — HYDROXYZINE PAMOATE SCH MG: 25 CAPSULE ORAL at 17:16

## 2021-05-16 RX ADMIN — Medication SCH MG: at 22:41

## 2021-05-16 RX ADMIN — Medication SCH TAB: at 10:33

## 2021-05-16 RX ADMIN — HYDROXYZINE PAMOATE SCH MG: 25 CAPSULE ORAL at 14:28

## 2021-05-16 RX ADMIN — TRAZODONE HYDROCHLORIDE SCH MG: 100 TABLET ORAL at 22:42

## 2021-05-16 RX ADMIN — Medication SCH: at 23:31

## 2021-05-16 RX ADMIN — HYDROXYZINE PAMOATE SCH MG: 25 CAPSULE ORAL at 10:33

## 2021-05-16 RX ADMIN — AMLODIPINE BESYLATE SCH MG: 5 TABLET ORAL at 10:33

## 2021-05-16 RX ADMIN — HYDROXYZINE PAMOATE SCH MG: 25 CAPSULE ORAL at 22:41

## 2021-05-16 RX ADMIN — HYDROXYZINE PAMOATE SCH MG: 25 CAPSULE ORAL at 05:37

## 2021-05-17 VITALS — SYSTOLIC BLOOD PRESSURE: 146 MMHG | DIASTOLIC BLOOD PRESSURE: 94 MMHG

## 2021-05-17 VITALS — TEMPERATURE: 96.9 F

## 2021-05-17 VITALS — HEART RATE: 75 BPM

## 2021-05-17 RX ADMIN — HYDROXYZINE PAMOATE SCH: 25 CAPSULE ORAL at 13:57

## 2021-05-17 RX ADMIN — Medication SCH: at 10:22

## 2021-05-17 RX ADMIN — NICOTINE SCH: 14 PATCH, EXTENDED RELEASE TRANSDERMAL at 10:22

## 2021-05-17 RX ADMIN — HYDROXYZINE PAMOATE SCH: 25 CAPSULE ORAL at 05:32

## 2021-05-17 RX ADMIN — HYDROXYZINE PAMOATE SCH: 25 CAPSULE ORAL at 10:22

## 2021-05-17 RX ADMIN — AMLODIPINE BESYLATE SCH: 5 TABLET ORAL at 10:22

## 2021-07-02 ENCOUNTER — HOSPITAL ENCOUNTER (INPATIENT)
Dept: HOSPITAL 74 - YASAS | Age: 71
LOS: 4 days | Discharge: HOME | DRG: 897 | End: 2021-07-06
Attending: ALLERGY & IMMUNOLOGY | Admitting: ALLERGY & IMMUNOLOGY
Payer: COMMERCIAL

## 2021-07-02 VITALS — BODY MASS INDEX: 22.6 KG/M2

## 2021-07-02 DIAGNOSIS — F14.20: ICD-10-CM

## 2021-07-02 DIAGNOSIS — N40.0: ICD-10-CM

## 2021-07-02 DIAGNOSIS — F19.24: ICD-10-CM

## 2021-07-02 DIAGNOSIS — R00.1: ICD-10-CM

## 2021-07-02 DIAGNOSIS — F10.230: Primary | ICD-10-CM

## 2021-07-02 DIAGNOSIS — I10: ICD-10-CM

## 2021-07-02 DIAGNOSIS — N39.41: ICD-10-CM

## 2021-07-02 DIAGNOSIS — K70.30: ICD-10-CM

## 2021-07-02 DIAGNOSIS — F17.210: ICD-10-CM

## 2021-07-02 DIAGNOSIS — B18.2: ICD-10-CM

## 2021-07-02 DIAGNOSIS — Z56.0: ICD-10-CM

## 2021-07-02 PROCEDURE — C9803 HOPD COVID-19 SPEC COLLECT: HCPCS

## 2021-07-02 PROCEDURE — U0003 INFECTIOUS AGENT DETECTION BY NUCLEIC ACID (DNA OR RNA); SEVERE ACUTE RESPIRATORY SYNDROME CORONAVIRUS 2 (SARS-COV-2) (CORONAVIRUS DISEASE [COVID-19]), AMPLIFIED PROBE TECHNIQUE, MAKING USE OF HIGH THROUGHPUT TECHNOLOGIES AS DESCRIBED BY CMS-2020-01-R: HCPCS

## 2021-07-02 PROCEDURE — U0005 INFEC AGEN DETEC AMPLI PROBE: HCPCS

## 2021-07-02 RX ADMIN — Medication SCH MG: at 22:22

## 2021-07-02 SDOH — ECONOMIC STABILITY - INCOME SECURITY: UNEMPLOYMENT, UNSPECIFIED: Z56.0

## 2021-07-03 PROCEDURE — HZ2ZZZZ DETOXIFICATION SERVICES FOR SUBSTANCE ABUSE TREATMENT: ICD-10-PCS | Performed by: ALLERGY & IMMUNOLOGY

## 2021-07-03 RX ADMIN — AMLODIPINE BESYLATE SCH MG: 5 TABLET ORAL at 10:31

## 2021-07-03 RX ADMIN — Medication SCH: at 23:03

## 2021-07-03 RX ADMIN — Medication SCH MG: at 22:35

## 2021-07-03 RX ADMIN — NICOTINE SCH: 14 PATCH, EXTENDED RELEASE TRANSDERMAL at 12:01

## 2021-07-03 RX ADMIN — Medication SCH TAB: at 12:01

## 2021-07-04 RX ADMIN — Medication SCH TAB: at 10:23

## 2021-07-04 RX ADMIN — TRAZODONE HYDROCHLORIDE SCH MG: 50 TABLET ORAL at 22:07

## 2021-07-04 RX ADMIN — Medication SCH MG: at 22:07

## 2021-07-04 RX ADMIN — Medication SCH: at 22:08

## 2021-07-04 RX ADMIN — AMLODIPINE BESYLATE SCH MG: 5 TABLET ORAL at 10:23

## 2021-07-04 RX ADMIN — NICOTINE SCH: 14 PATCH, EXTENDED RELEASE TRANSDERMAL at 10:23

## 2021-07-05 LAB
ALBUMIN SERPL-MCNC: 3.1 G/DL (ref 3.4–5)
ALP SERPL-CCNC: 71 U/L (ref 45–117)
ALT SERPL-CCNC: 20 U/L (ref 13–61)
ANION GAP SERPL CALC-SCNC: 8 MMOL/L (ref 8–16)
AST SERPL-CCNC: 21 U/L (ref 15–37)
BILIRUB SERPL-MCNC: 0.3 MG/DL (ref 0.2–1)
BUN SERPL-MCNC: 15.5 MG/DL (ref 7–18)
CALCIUM SERPL-MCNC: 8.4 MG/DL (ref 8.5–10.1)
CHLORIDE SERPL-SCNC: 107 MMOL/L (ref 98–107)
CO2 SERPL-SCNC: 27 MMOL/L (ref 21–32)
CREAT SERPL-MCNC: 1.1 MG/DL (ref 0.55–1.3)
DEPRECATED RDW RBC AUTO: 13.7 % (ref 11.9–15.9)
GLUCOSE SERPL-MCNC: 98 MG/DL (ref 74–106)
HCT VFR BLD CALC: 40.3 % (ref 35.4–49)
HGB BLD-MCNC: 13.3 GM/DL (ref 11.7–16.9)
MCH RBC QN AUTO: 32.7 PG (ref 25.7–33.7)
MCHC RBC AUTO-ENTMCNC: 33 G/DL (ref 32–35.9)
MCV RBC: 99.2 FL (ref 80–96)
PLATELET # BLD AUTO: 150 10^3/UL (ref 134–434)
PMV BLD: 11.1 FL (ref 7.5–11.1)
PROT SERPL-MCNC: 7.1 G/DL (ref 6.4–8.2)
RBC # BLD AUTO: 4.06 M/MM3 (ref 4–5.6)
SODIUM SERPL-SCNC: 142 MMOL/L (ref 136–145)
WBC # BLD AUTO: 6 K/MM3 (ref 4–10)

## 2021-07-05 RX ADMIN — Medication SCH MG: at 22:39

## 2021-07-05 RX ADMIN — AMLODIPINE BESYLATE SCH MG: 10 TABLET ORAL at 10:06

## 2021-07-05 RX ADMIN — Medication SCH TAB: at 10:06

## 2021-07-05 RX ADMIN — Medication SCH MG: at 22:41

## 2021-07-05 RX ADMIN — NICOTINE SCH: 14 PATCH, EXTENDED RELEASE TRANSDERMAL at 10:08

## 2021-07-05 RX ADMIN — TRAZODONE HYDROCHLORIDE SCH MG: 50 TABLET ORAL at 22:39

## 2021-07-05 RX ADMIN — LIDOCAINE SCH PATCH: 50 PATCH TOPICAL at 10:08

## 2021-07-06 VITALS — TEMPERATURE: 97.7 F | HEART RATE: 56 BPM | DIASTOLIC BLOOD PRESSURE: 68 MMHG | SYSTOLIC BLOOD PRESSURE: 113 MMHG

## 2021-07-06 RX ADMIN — LIDOCAINE SCH: 50 PATCH TOPICAL at 09:15

## 2021-07-06 RX ADMIN — Medication SCH TAB: at 09:14

## 2021-07-06 RX ADMIN — AMLODIPINE BESYLATE SCH MG: 10 TABLET ORAL at 09:14

## 2021-07-06 RX ADMIN — NICOTINE SCH: 14 PATCH, EXTENDED RELEASE TRANSDERMAL at 09:14

## 2021-07-22 ENCOUNTER — HOSPITAL ENCOUNTER (INPATIENT)
Dept: HOSPITAL 74 - YASAS | Age: 71
LOS: 4 days | Discharge: OTHER INSTITUTION | DRG: 897 | End: 2021-07-26
Attending: ALLERGY & IMMUNOLOGY | Admitting: ALLERGY & IMMUNOLOGY
Payer: COMMERCIAL

## 2021-07-22 ENCOUNTER — HOSPITAL ENCOUNTER (EMERGENCY)
Dept: HOSPITAL 74 - JER | Age: 71
LOS: 1 days | Discharge: HOME | End: 2021-07-23
Payer: COMMERCIAL

## 2021-07-22 VITALS — BODY MASS INDEX: 24.1 KG/M2

## 2021-07-22 VITALS — BODY MASS INDEX: 22.4 KG/M2

## 2021-07-22 DIAGNOSIS — J45.909: ICD-10-CM

## 2021-07-22 DIAGNOSIS — Z86.73: ICD-10-CM

## 2021-07-22 DIAGNOSIS — N40.0: ICD-10-CM

## 2021-07-22 DIAGNOSIS — F14.20: ICD-10-CM

## 2021-07-22 DIAGNOSIS — F10.230: Primary | ICD-10-CM

## 2021-07-22 DIAGNOSIS — K70.30: ICD-10-CM

## 2021-07-22 DIAGNOSIS — F17.210: ICD-10-CM

## 2021-07-22 DIAGNOSIS — R51.9: ICD-10-CM

## 2021-07-22 DIAGNOSIS — F34.1: ICD-10-CM

## 2021-07-22 DIAGNOSIS — R51.9: Primary | ICD-10-CM

## 2021-07-22 DIAGNOSIS — F10.24: ICD-10-CM

## 2021-07-22 DIAGNOSIS — I10: ICD-10-CM

## 2021-07-22 DIAGNOSIS — F10.282: ICD-10-CM

## 2021-07-22 DIAGNOSIS — F16.10: ICD-10-CM

## 2021-07-22 DIAGNOSIS — B18.2: ICD-10-CM

## 2021-07-22 DIAGNOSIS — R26.89: ICD-10-CM

## 2021-07-22 DIAGNOSIS — R53.1: ICD-10-CM

## 2021-07-22 DIAGNOSIS — R47.1: ICD-10-CM

## 2021-07-22 LAB
ALBUMIN SERPL-MCNC: 3.1 G/DL (ref 3.4–5)
ALBUMIN SERPL-MCNC: 3.5 G/DL (ref 3.4–5)
ALP SERPL-CCNC: 75 U/L (ref 45–117)
ALP SERPL-CCNC: 86 U/L (ref 45–117)
ALT SERPL-CCNC: 23 U/L (ref 13–61)
ALT SERPL-CCNC: 26 U/L (ref 13–61)
ANION GAP SERPL CALC-SCNC: 5 MMOL/L (ref 8–16)
ANION GAP SERPL CALC-SCNC: 5 MMOL/L (ref 8–16)
APTT BLD: 30.6 SECONDS (ref 25.2–36.5)
AST SERPL-CCNC: 26 U/L (ref 15–37)
AST SERPL-CCNC: 28 U/L (ref 15–37)
BASOPHILS # BLD: 0.8 % (ref 0–2)
BILIRUB SERPL-MCNC: 0.2 MG/DL (ref 0.2–1)
BILIRUB SERPL-MCNC: 0.3 MG/DL (ref 0.2–1)
BUN SERPL-MCNC: 16.9 MG/DL (ref 7–18)
BUN SERPL-MCNC: 20.6 MG/DL (ref 7–18)
CALCIUM SERPL-MCNC: 8.7 MG/DL (ref 8.5–10.1)
CALCIUM SERPL-MCNC: 8.7 MG/DL (ref 8.5–10.1)
CHLORIDE SERPL-SCNC: 107 MMOL/L (ref 98–107)
CHLORIDE SERPL-SCNC: 109 MMOL/L (ref 98–107)
CO2 SERPL-SCNC: 28 MMOL/L (ref 21–32)
CO2 SERPL-SCNC: 28 MMOL/L (ref 21–32)
CREAT SERPL-MCNC: 1.3 MG/DL (ref 0.55–1.3)
CREAT SERPL-MCNC: 1.4 MG/DL (ref 0.55–1.3)
DEPRECATED RDW RBC AUTO: 13.9 % (ref 11.9–15.9)
DEPRECATED RDW RBC AUTO: 14.2 % (ref 11.9–15.9)
EOSINOPHIL # BLD: 2 % (ref 0–4.5)
GLUCOSE SERPL-MCNC: 91 MG/DL (ref 74–106)
GLUCOSE SERPL-MCNC: 97 MG/DL (ref 74–106)
HCT VFR BLD CALC: 35.9 % (ref 35.4–49)
HCT VFR BLD CALC: 38.6 % (ref 35.4–49)
HGB BLD-MCNC: 12.2 GM/DL (ref 11.7–16.9)
HGB BLD-MCNC: 13.1 GM/DL (ref 11.7–16.9)
INR BLD: 1.14 (ref 0.83–1.09)
LYMPHOCYTES # BLD: 42.6 % (ref 8–40)
MAGNESIUM SERPL-MCNC: 2.4 MG/DL (ref 1.8–2.4)
MCH RBC QN AUTO: 33.3 PG (ref 25.7–33.7)
MCH RBC QN AUTO: 33.7 PG (ref 25.7–33.7)
MCHC RBC AUTO-ENTMCNC: 33.9 G/DL (ref 32–35.9)
MCHC RBC AUTO-ENTMCNC: 34 G/DL (ref 32–35.9)
MCV RBC: 98 FL (ref 80–96)
MCV RBC: 99.3 FL (ref 80–96)
MONOCYTES # BLD AUTO: 14.3 % (ref 3.8–10.2)
NEUTROPHILS # BLD: 40.3 % (ref 42.8–82.8)
PLATELET # BLD AUTO: 127 10^3/UL (ref 134–434)
PLATELET # BLD AUTO: 143 10^3/UL (ref 134–434)
PMV BLD: 10.2 FL (ref 7.5–11.1)
PMV BLD: 9.7 FL (ref 7.5–11.1)
PROT SERPL-MCNC: 7 G/DL (ref 6.4–8.2)
PROT SERPL-MCNC: 7.8 G/DL (ref 6.4–8.2)
PT PNL PPP: 14 SEC (ref 9.7–13)
RBC # BLD AUTO: 3.66 M/MM3 (ref 4–5.6)
RBC # BLD AUTO: 3.89 M/MM3 (ref 4–5.6)
SODIUM SERPL-SCNC: 140 MMOL/L (ref 136–145)
SODIUM SERPL-SCNC: 142 MMOL/L (ref 136–145)
WBC # BLD AUTO: 5.2 K/MM3 (ref 4–10)
WBC # BLD AUTO: 5.3 K/MM3 (ref 4–10)

## 2021-07-22 PROCEDURE — U0003 INFECTIOUS AGENT DETECTION BY NUCLEIC ACID (DNA OR RNA); SEVERE ACUTE RESPIRATORY SYNDROME CORONAVIRUS 2 (SARS-COV-2) (CORONAVIRUS DISEASE [COVID-19]), AMPLIFIED PROBE TECHNIQUE, MAKING USE OF HIGH THROUGHPUT TECHNOLOGIES AS DESCRIBED BY CMS-2020-01-R: HCPCS

## 2021-07-22 PROCEDURE — U0005 INFEC AGEN DETEC AMPLI PROBE: HCPCS

## 2021-07-22 PROCEDURE — C9803 HOPD COVID-19 SPEC COLLECT: HCPCS

## 2021-07-22 PROCEDURE — HZ2ZZZZ DETOXIFICATION SERVICES FOR SUBSTANCE ABUSE TREATMENT: ICD-10-PCS | Performed by: ALLERGY & IMMUNOLOGY

## 2021-07-22 RX ADMIN — HYDROXYZINE PAMOATE SCH: 25 CAPSULE ORAL at 23:02

## 2021-07-22 RX ADMIN — HYDROXYZINE PAMOATE SCH MG: 25 CAPSULE ORAL at 13:35

## 2021-07-22 RX ADMIN — Medication SCH: at 23:03

## 2021-07-22 RX ADMIN — NICOTINE SCH: 14 PATCH, EXTENDED RELEASE TRANSDERMAL at 13:35

## 2021-07-22 RX ADMIN — HYDROXYZINE PAMOATE SCH: 25 CAPSULE ORAL at 17:49

## 2021-07-23 VITALS — TEMPERATURE: 97.6 F | DIASTOLIC BLOOD PRESSURE: 69 MMHG | HEART RATE: 51 BPM | SYSTOLIC BLOOD PRESSURE: 106 MMHG

## 2021-07-23 RX ADMIN — Medication SCH TAB: at 10:17

## 2021-07-23 RX ADMIN — HYDROXYZINE PAMOATE SCH MG: 25 CAPSULE ORAL at 05:09

## 2021-07-23 RX ADMIN — TRAZODONE HYDROCHLORIDE SCH: 50 TABLET ORAL at 22:35

## 2021-07-23 RX ADMIN — Medication SCH MG: at 22:33

## 2021-07-23 RX ADMIN — AMLODIPINE BESYLATE SCH MG: 10 TABLET ORAL at 10:18

## 2021-07-23 RX ADMIN — FOLIC ACID SCH MG: 1 TABLET ORAL at 10:18

## 2021-07-23 RX ADMIN — NICOTINE SCH: 14 PATCH, EXTENDED RELEASE TRANSDERMAL at 10:18

## 2021-07-24 RX ADMIN — Medication SCH MG: at 22:08

## 2021-07-24 RX ADMIN — AMLODIPINE BESYLATE SCH MG: 10 TABLET ORAL at 10:19

## 2021-07-24 RX ADMIN — Medication SCH TAB: at 10:19

## 2021-07-24 RX ADMIN — NICOTINE SCH: 14 PATCH, EXTENDED RELEASE TRANSDERMAL at 10:19

## 2021-07-24 RX ADMIN — FOLIC ACID SCH MG: 1 TABLET ORAL at 10:19

## 2021-07-24 RX ADMIN — TRAZODONE HYDROCHLORIDE SCH MG: 50 TABLET ORAL at 22:09

## 2021-07-25 RX ADMIN — ACETAMINOPHEN PRN MG: 325 TABLET ORAL at 22:18

## 2021-07-25 RX ADMIN — TRAZODONE HYDROCHLORIDE SCH MG: 50 TABLET ORAL at 22:17

## 2021-07-25 RX ADMIN — FOLIC ACID SCH MG: 1 TABLET ORAL at 10:14

## 2021-07-25 RX ADMIN — ACETAMINOPHEN PRN MG: 325 TABLET ORAL at 05:54

## 2021-07-25 RX ADMIN — Medication SCH MG: at 22:17

## 2021-07-25 RX ADMIN — NICOTINE SCH: 14 PATCH, EXTENDED RELEASE TRANSDERMAL at 10:18

## 2021-07-25 RX ADMIN — Medication SCH TAB: at 10:16

## 2021-07-25 RX ADMIN — AMLODIPINE BESYLATE SCH MG: 10 TABLET ORAL at 10:14

## 2021-07-26 ENCOUNTER — HOSPITAL ENCOUNTER (INPATIENT)
Dept: HOSPITAL 74 - YASAS | Age: 71
LOS: 4 days | Discharge: HOME | DRG: 895 | End: 2021-07-30
Attending: ALLERGY & IMMUNOLOGY | Admitting: ALLERGY & IMMUNOLOGY
Payer: COMMERCIAL

## 2021-07-26 VITALS — HEART RATE: 63 BPM | DIASTOLIC BLOOD PRESSURE: 67 MMHG | SYSTOLIC BLOOD PRESSURE: 112 MMHG | TEMPERATURE: 99.3 F

## 2021-07-26 VITALS — TEMPERATURE: 97.1 F

## 2021-07-26 DIAGNOSIS — F10.20: Primary | ICD-10-CM

## 2021-07-26 DIAGNOSIS — K70.30: ICD-10-CM

## 2021-07-26 DIAGNOSIS — Z91.5: ICD-10-CM

## 2021-07-26 DIAGNOSIS — F34.1: ICD-10-CM

## 2021-07-26 DIAGNOSIS — N40.0: ICD-10-CM

## 2021-07-26 DIAGNOSIS — F14.20: ICD-10-CM

## 2021-07-26 DIAGNOSIS — R25.1: ICD-10-CM

## 2021-07-26 DIAGNOSIS — I10: ICD-10-CM

## 2021-07-26 DIAGNOSIS — Z86.19: ICD-10-CM

## 2021-07-26 DIAGNOSIS — F17.210: ICD-10-CM

## 2021-07-26 PROCEDURE — HZ40ZZZ GROUP COUNSELING FOR SUBSTANCE ABUSE TREATMENT, COGNITIVE: ICD-10-PCS | Performed by: ALLERGY & IMMUNOLOGY

## 2021-07-26 RX ADMIN — NICOTINE SCH: 14 PATCH, EXTENDED RELEASE TRANSDERMAL at 10:03

## 2021-07-26 RX ADMIN — HYDROXYZINE PAMOATE SCH: 25 CAPSULE ORAL at 18:08

## 2021-07-26 RX ADMIN — TRAZODONE HYDROCHLORIDE SCH MG: 50 TABLET ORAL at 21:49

## 2021-07-26 RX ADMIN — Medication SCH MG: at 21:47

## 2021-07-26 RX ADMIN — Medication SCH TAB: at 10:03

## 2021-07-26 RX ADMIN — FOLIC ACID SCH MG: 1 TABLET ORAL at 10:03

## 2021-07-26 RX ADMIN — HYDROXYZINE PAMOATE SCH MG: 25 CAPSULE ORAL at 21:49

## 2021-07-26 RX ADMIN — AMLODIPINE BESYLATE SCH MG: 10 TABLET ORAL at 10:03

## 2021-07-26 RX ADMIN — HYDROXYZINE PAMOATE SCH MG: 25 CAPSULE ORAL at 18:46

## 2021-07-27 RX ADMIN — HYDROXYZINE PAMOATE SCH MG: 25 CAPSULE ORAL at 21:15

## 2021-07-27 RX ADMIN — AMLODIPINE BESYLATE SCH MG: 10 TABLET ORAL at 10:14

## 2021-07-27 RX ADMIN — HYDROXYZINE PAMOATE SCH MG: 25 CAPSULE ORAL at 14:00

## 2021-07-27 RX ADMIN — Medication SCH MG: at 21:15

## 2021-07-27 RX ADMIN — ESCITALOPRAM OXALATE SCH MG: 10 TABLET, FILM COATED ORAL at 10:15

## 2021-07-27 RX ADMIN — ACETAMINOPHEN PRN MG: 325 TABLET ORAL at 21:15

## 2021-07-27 RX ADMIN — HYDROXYZINE PAMOATE SCH MG: 25 CAPSULE ORAL at 17:27

## 2021-07-27 RX ADMIN — IBUPROFEN PRN MG: 400 TABLET, FILM COATED ORAL at 16:40

## 2021-07-27 RX ADMIN — FOLIC ACID SCH MG: 1 TABLET ORAL at 10:14

## 2021-07-27 RX ADMIN — Medication SCH TAB: at 10:13

## 2021-07-27 RX ADMIN — NICOTINE SCH: 14 PATCH, EXTENDED RELEASE TRANSDERMAL at 10:14

## 2021-07-27 RX ADMIN — HYDROXYZINE PAMOATE SCH MG: 25 CAPSULE ORAL at 06:13

## 2021-07-27 RX ADMIN — TRAZODONE HYDROCHLORIDE SCH MG: 50 TABLET ORAL at 21:15

## 2021-07-27 RX ADMIN — HYDROXYZINE PAMOATE SCH MG: 25 CAPSULE ORAL at 10:15

## 2021-07-28 RX ADMIN — HYDROXYZINE PAMOATE SCH: 25 CAPSULE ORAL at 13:40

## 2021-07-28 RX ADMIN — Medication SCH MG: at 21:09

## 2021-07-28 RX ADMIN — AMLODIPINE BESYLATE SCH MG: 10 TABLET ORAL at 09:45

## 2021-07-28 RX ADMIN — TRAZODONE HYDROCHLORIDE SCH MG: 50 TABLET ORAL at 21:09

## 2021-07-28 RX ADMIN — Medication SCH EACH: at 21:09

## 2021-07-28 RX ADMIN — ESCITALOPRAM OXALATE SCH MG: 10 TABLET, FILM COATED ORAL at 09:45

## 2021-07-28 RX ADMIN — HYDROXYZINE PAMOATE SCH: 25 CAPSULE ORAL at 18:30

## 2021-07-28 RX ADMIN — Medication SCH TAB: at 09:45

## 2021-07-28 RX ADMIN — LIDOCAINE SCH PATCH: 50 PATCH TOPICAL at 13:36

## 2021-07-28 RX ADMIN — IBUPROFEN PRN MG: 400 TABLET, FILM COATED ORAL at 06:20

## 2021-07-28 RX ADMIN — HYDROXYZINE PAMOATE SCH MG: 25 CAPSULE ORAL at 21:09

## 2021-07-28 RX ADMIN — HYDROXYZINE PAMOATE SCH MG: 25 CAPSULE ORAL at 06:20

## 2021-07-28 RX ADMIN — ACETAMINOPHEN PRN MG: 325 TABLET ORAL at 09:47

## 2021-07-28 RX ADMIN — FOLIC ACID SCH MG: 1 TABLET ORAL at 11:36

## 2021-07-28 RX ADMIN — HYDROXYZINE PAMOATE SCH MG: 25 CAPSULE ORAL at 09:45

## 2021-07-28 RX ADMIN — NICOTINE SCH: 14 PATCH, EXTENDED RELEASE TRANSDERMAL at 09:46

## 2021-07-29 RX ADMIN — CARBAMIDE PEROXIDE SCH: 6.5 SOLUTION/ DROPS TOPICAL at 22:12

## 2021-07-29 RX ADMIN — Medication SCH TAB: at 09:41

## 2021-07-29 RX ADMIN — HYDROXYZINE PAMOATE SCH: 25 CAPSULE ORAL at 13:12

## 2021-07-29 RX ADMIN — HYDROXYZINE PAMOATE SCH MG: 25 CAPSULE ORAL at 22:12

## 2021-07-29 RX ADMIN — FOLIC ACID SCH MG: 1 TABLET ORAL at 09:40

## 2021-07-29 RX ADMIN — Medication SCH MG: at 22:10

## 2021-07-29 RX ADMIN — HYDROXYZINE PAMOATE SCH MG: 25 CAPSULE ORAL at 18:02

## 2021-07-29 RX ADMIN — HYDROXYZINE PAMOATE SCH: 25 CAPSULE ORAL at 06:24

## 2021-07-29 RX ADMIN — Medication SCH: at 22:11

## 2021-07-29 RX ADMIN — ESCITALOPRAM OXALATE SCH MG: 10 TABLET, FILM COATED ORAL at 09:40

## 2021-07-29 RX ADMIN — AMLODIPINE BESYLATE SCH: 10 TABLET ORAL at 09:41

## 2021-07-29 RX ADMIN — NICOTINE SCH: 14 PATCH, EXTENDED RELEASE TRANSDERMAL at 09:41

## 2021-07-29 RX ADMIN — TRAZODONE HYDROCHLORIDE SCH MG: 50 TABLET ORAL at 22:10

## 2021-07-29 RX ADMIN — CARBAMIDE PEROXIDE SCH DROP: 6.5 SOLUTION/ DROPS TOPICAL at 12:50

## 2021-07-29 RX ADMIN — HYDROXYZINE PAMOATE SCH: 25 CAPSULE ORAL at 09:41

## 2021-07-29 RX ADMIN — LIDOCAINE SCH PATCH: 50 PATCH TOPICAL at 09:40

## 2021-07-29 RX ADMIN — Medication SCH MG: at 22:11

## 2021-07-30 VITALS — HEART RATE: 54 BPM | DIASTOLIC BLOOD PRESSURE: 80 MMHG | SYSTOLIC BLOOD PRESSURE: 134 MMHG

## 2021-07-30 RX ADMIN — HYDROXYZINE PAMOATE SCH MG: 25 CAPSULE ORAL at 06:17

## 2021-07-30 RX ADMIN — AMLODIPINE BESYLATE SCH MG: 10 TABLET ORAL at 09:03

## 2021-07-30 RX ADMIN — CARBAMIDE PEROXIDE SCH DROP: 6.5 SOLUTION/ DROPS TOPICAL at 09:05

## 2021-07-30 RX ADMIN — ESCITALOPRAM OXALATE SCH MG: 10 TABLET, FILM COATED ORAL at 09:04

## 2021-07-30 RX ADMIN — FOLIC ACID SCH MG: 1 TABLET ORAL at 09:04

## 2021-07-30 RX ADMIN — Medication SCH TAB: at 09:04
